# Patient Record
Sex: MALE | URBAN - METROPOLITAN AREA
[De-identification: names, ages, dates, MRNs, and addresses within clinical notes are randomized per-mention and may not be internally consistent; named-entity substitution may affect disease eponyms.]

---

## 2024-07-25 ENCOUNTER — APPOINTMENT (RX ONLY)
Dept: URBAN - METROPOLITAN AREA CLINIC 168 | Facility: CLINIC | Age: 64
Setting detail: DERMATOLOGY
End: 2024-07-25

## 2024-07-25 DIAGNOSIS — L50.1 IDIOPATHIC URTICARIA: ICD-10-CM | Status: INADEQUATELY CONTROLLED

## 2024-07-25 PROCEDURE — ? COUNSELING

## 2024-07-25 PROCEDURE — ? PRESCRIPTION

## 2024-07-25 PROCEDURE — ? PRESCRIPTION MEDICATION MANAGEMENT

## 2024-07-25 PROCEDURE — 99204 OFFICE O/P NEW MOD 45 MIN: CPT

## 2024-07-25 RX ORDER — LORATADINE 10 MG/1
1 CAPSULE, LIQUID FILLED ORAL BID
Qty: 60 | Refills: 3 | Status: ERX | COMMUNITY
Start: 2024-07-25

## 2024-07-25 RX ORDER — TRIAMCINOLONE ACETONIDE 1 MG/G
DAB CREAM TOPICAL BID X 2 WEEKS
Qty: 453.6 | Refills: 1 | Status: ERX | COMMUNITY
Start: 2024-07-25

## 2024-07-25 RX ADMIN — TRIAMCINOLONE ACETONIDE DAB: 1 CREAM TOPICAL at 00:00

## 2024-07-25 RX ADMIN — LORATADINE 1: 10 CAPSULE, LIQUID FILLED ORAL at 00:00

## 2024-07-25 ASSESSMENT — LOCATION DETAILED DESCRIPTION DERM
LOCATION DETAILED: LEFT ANTERIOR DISTAL UPPER ARM
LOCATION DETAILED: RIGHT ANTERIOR DISTAL UPPER ARM
LOCATION DETAILED: LEFT ANTERIOR PROXIMAL THIGH
LOCATION DETAILED: RIGHT ANTERIOR PROXIMAL THIGH

## 2024-07-25 ASSESSMENT — LOCATION SIMPLE DESCRIPTION DERM
LOCATION SIMPLE: LEFT UPPER ARM
LOCATION SIMPLE: RIGHT THIGH
LOCATION SIMPLE: LEFT THIGH
LOCATION SIMPLE: RIGHT UPPER ARM

## 2024-07-25 ASSESSMENT — LOCATION ZONE DERM
LOCATION ZONE: ARM
LOCATION ZONE: LEG

## 2024-07-25 NOTE — PROCEDURE: PRESCRIPTION MEDICATION MANAGEMENT
Plan: Wash with Dove bar soap fragrance free and stop using current body wash
Render In Strict Bullet Format?: No
Initiate Treatment: Triamcinolone acetonide 0.1% cream apply to arms and legs BID x 2 weeks\\nLoratadine 10mg take 1-2 tablets once or twice daily (Max 4 pills daily)
Detail Level: Simple

## 2024-07-29 ENCOUNTER — RX ONLY (OUTPATIENT)
Age: 64
Setting detail: RX ONLY
End: 2024-07-29

## 2024-07-29 RX ORDER — LORATADINE 10 MG/1
1 CAPSULE, LIQUID FILLED ORAL BID
Qty: 60 | Refills: 3 | Status: CANCELLED
Stop reason: CLARIF

## 2024-07-29 RX ORDER — TRIAMCINOLONE ACETONIDE 1 MG/G
DAB CREAM TOPICAL BID X 2 WEEKS
Qty: 30 | Refills: 0 | Status: ERX

## 2024-08-15 ENCOUNTER — APPOINTMENT (RX ONLY)
Dept: URBAN - METROPOLITAN AREA CLINIC 168 | Facility: CLINIC | Age: 64
Setting detail: DERMATOLOGY
End: 2024-08-15

## 2024-08-15 DIAGNOSIS — L50.1 IDIOPATHIC URTICARIA: ICD-10-CM | Status: IMPROVED

## 2024-08-15 PROCEDURE — 99213 OFFICE O/P EST LOW 20 MIN: CPT

## 2024-08-15 PROCEDURE — ? PRESCRIPTION MEDICATION MANAGEMENT

## 2024-08-15 PROCEDURE — ? COUNSELING

## 2024-08-15 ASSESSMENT — LOCATION SIMPLE DESCRIPTION DERM
LOCATION SIMPLE: RIGHT THIGH
LOCATION SIMPLE: LEFT UPPER ARM
LOCATION SIMPLE: RIGHT UPPER ARM
LOCATION SIMPLE: LEFT THIGH

## 2024-08-15 ASSESSMENT — LOCATION ZONE DERM
LOCATION ZONE: LEG
LOCATION ZONE: ARM

## 2024-08-15 ASSESSMENT — LOCATION DETAILED DESCRIPTION DERM
LOCATION DETAILED: RIGHT ANTERIOR DISTAL UPPER ARM
LOCATION DETAILED: RIGHT ANTERIOR PROXIMAL THIGH
LOCATION DETAILED: LEFT ANTERIOR PROXIMAL THIGH
LOCATION DETAILED: LEFT ANTERIOR DISTAL UPPER ARM

## 2024-08-15 NOTE — PROCEDURE: PRESCRIPTION MEDICATION MANAGEMENT
Modify Regimen: Loratadine 10mg take 1 tablets once daily
Plan: Wash with Dove bar soap fragrance free and stop using current body wash
Render In Strict Bullet Format?: No
Detail Level: Simple
Discontinue Regimen: Triamcinolone acetonide 0.1% cream apply to arms and legs BID x 2 weeks prn
Samples Given: Cerave Itch Relief Moisturizing Lotion